# Patient Record
Sex: FEMALE | Race: WHITE | NOT HISPANIC OR LATINO | URBAN - METROPOLITAN AREA
[De-identification: names, ages, dates, MRNs, and addresses within clinical notes are randomized per-mention and may not be internally consistent; named-entity substitution may affect disease eponyms.]

---

## 2024-07-19 ENCOUNTER — OFFICE VISIT (OUTPATIENT)
Dept: OPHTHALMOLOGY | Facility: CLINIC | Age: 64
End: 2024-07-19
Payer: COMMERCIAL

## 2024-07-19 DIAGNOSIS — H25.13 NUCLEAR SCLEROSIS OF BOTH EYES: ICD-10-CM

## 2024-07-19 DIAGNOSIS — H18.832 RECURRENT EROSION OF LEFT CORNEA: ICD-10-CM

## 2024-07-19 DIAGNOSIS — H40.033 ANATOMICAL NARROW ANGLE OF BOTH EYES: Primary | ICD-10-CM

## 2024-07-19 PROCEDURE — 99999 PR PBB SHADOW E&M-NEW PATIENT-LVL II: CPT | Mod: PBBFAC,,, | Performed by: OPHTHALMOLOGY

## 2024-07-19 NOTE — PROGRESS NOTES
HPI     Eye Problem     Additional comments: Pt here for narrow angle eval. Pt says she went to   [facility] about a month ago and was told she has some high pressures   and has narrow angles. No fhx of glaucoma. No pain or discomfort. VA   stable.          Last edited by Nba Ríos MA on 7/19/2024  8:05 AM.            Assessment /Plan     For exam results, see Encounter Report.    Anatomical narrow angle of both eyes  IOP borderline. Angles narrow on gonioscopy but open with dynamic gonioscopy. Explained r/b/a to observation vs. LPI. Patient elects for LPI .  - Plan for LPI OD then OS. Patient to call when she is able to schedule.    Nuclear sclerosis of both eyes  Cataracts are present but not visually significant. Will continue to monitor.     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 Doing well using Systane, continue.

## 2024-07-30 ENCOUNTER — TELEPHONE (OUTPATIENT)
Dept: OPHTHALMOLOGY | Facility: CLINIC | Age: 64
End: 2024-07-30
Payer: COMMERCIAL

## 2024-07-30 NOTE — TELEPHONE ENCOUNTER
----- Message from Carlota Baez sent at 7/30/2024  2:46 PM CDT -----  Hello,    Patient called to schedule laser surgery appointment.  Please contact patient to schedule.    Thanks

## 2024-07-31 ENCOUNTER — TELEPHONE (OUTPATIENT)
Dept: OPHTHALMOLOGY | Facility: CLINIC | Age: 64
End: 2024-07-31
Payer: COMMERCIAL

## 2024-07-31 NOTE — TELEPHONE ENCOUNTER
"Spoke with patient who said Letty told her to "call back tomorrow with her LPI surgery date preference."  Patient stated sept 20th should work. I let her know I can not schedule her LPI and that Dr. Shepherd and her team were out for the day and could respond to her request tomorrow when they return. Pt expressed her frustration with us not being able to freely schedule procedures on behalf of another doctor's schedule and not knowing that they wouldn't be able to assist her the following day as she was told. I reassured patient she would have the ability to schedule within a timely manner and before the year end as she was recommended per Dr. Shepherd and as she would prefer.   Pt will call back 8/1/24 to schedule.   "

## 2024-08-26 ENCOUNTER — TELEPHONE (OUTPATIENT)
Dept: OPHTHALMOLOGY | Facility: CLINIC | Age: 64
End: 2024-08-26
Payer: COMMERCIAL

## 2024-10-21 ENCOUNTER — TELEPHONE (OUTPATIENT)
Dept: OPHTHALMOLOGY | Facility: CLINIC | Age: 64
End: 2024-10-21

## 2024-10-21 NOTE — TELEPHONE ENCOUNTER
----- Message from Divina sent at 10/21/2024  2:18 PM CDT -----  [phone] pt would like to speak with nurse staff about schedule Procedure she has questions for nurse

## 2024-11-08 ENCOUNTER — OFFICE VISIT (OUTPATIENT)
Dept: OPHTHALMOLOGY | Facility: CLINIC | Age: 64
End: 2024-11-08
Payer: COMMERCIAL

## 2024-11-08 DIAGNOSIS — H40.033 ANATOMICAL NARROW ANGLE OF BOTH EYES: Primary | ICD-10-CM

## 2024-11-08 PROCEDURE — 99999 PR PBB SHADOW E&M-EST. PATIENT-LVL II: CPT | Mod: PBBFAC,,, | Performed by: OPHTHALMOLOGY

## 2024-11-08 RX ORDER — PREDNISOLONE ACETATE 10 MG/ML
1 SUSPENSION/ DROPS OPHTHALMIC 4 TIMES DAILY
Qty: 5 ML | Refills: 0 | Status: SHIPPED | OUTPATIENT
Start: 2024-11-08 | End: 2024-11-08

## 2024-11-08 RX ORDER — PREDNISOLONE ACETATE 10 MG/ML
1 SUSPENSION/ DROPS OPHTHALMIC 4 TIMES DAILY
Qty: 5 ML | Refills: 0 | Status: SHIPPED | OUTPATIENT
Start: 2024-11-08 | End: 2025-11-08

## 2024-11-08 NOTE — PROGRESS NOTES
HPI     Post-op Evaluation     Additional comments: Pt reports for LPI OD  Pt states her VA has been stable and has no complaints of any new vision   changes.  Pt denies any eye pain.  Pt denies any new ocular disturbances.               Comments    Referred by [facility]    1. Narrow angles  No fhx COAG     Systane OU PRN    2. LPI OU          Last edited by Yessy Easley on 11/8/2024  8:53 AM.            Assessment /Plan     For exam results, see Encounter Report.    Anatomical narrow angle of both eyes  PROCEDURE NOTE    Pre-op Diagnosis : Anatomic Narrow Angle Glaucoma right Eye    Post-op Diagnosis : Anatomic Narrow Angle Glaucoma right Eye    Surgeon: Kenzie Shepherd M.D.    Complications : None    Time Out Sheet : completed and signed    The patient was then postitioned behind the Yag laser:    10 Bursts  1:1 ratio   2 mJoules    Patent PI was created at the conclusion of the procedure. The patient received Alphagan pre and post op and was discharged to home in good condition.   Discharge medications: Prednisolone Acetate 1% four times a day to the operative eye  RTC one week for LPI OS

## 2024-12-09 ENCOUNTER — OFFICE VISIT (OUTPATIENT)
Dept: OPHTHALMOLOGY | Facility: CLINIC | Age: 64
End: 2024-12-09
Payer: COMMERCIAL

## 2024-12-09 DIAGNOSIS — H40.033 ANATOMICAL NARROW ANGLE OF BOTH EYES: Primary | ICD-10-CM

## 2024-12-09 PROCEDURE — 99999 PR PBB SHADOW E&M-EST. PATIENT-LVL II: CPT | Mod: PBBFAC,,, | Performed by: OPHTHALMOLOGY

## 2024-12-09 PROCEDURE — 66761 REVISION OF IRIS: CPT | Mod: LT,S$GLB,, | Performed by: OPHTHALMOLOGY

## 2024-12-09 PROCEDURE — 99499 UNLISTED E&M SERVICE: CPT | Mod: S$GLB,,, | Performed by: OPHTHALMOLOGY

## 2024-12-09 NOTE — PROGRESS NOTES
HPI     LPI OS     Additional comments: Prednisolone Acetate 1% four times a day OD           Comments    1. Narrow angles  No fhx COAG      Systane OU PRN            Last edited by Tressa Joy on 12/9/2024 10:06 AM.            Assessment /Plan     For exam results, see Encounter Report.    Anatomical narrow angle of both eyes  PROCEDURE NOTE    Pre-op Diagnosis : Anatomic Narrow Angle Glaucoma left Eye    Post-op Diagnosis : Anatomic Narrow Angle Glaucoma left Eye    Surgeon: Kenzie Shepherd M.D.    Complications : None    Time Out Sheet : completed and signed    After obtaining informed consent, the patient was positioned behind the laser for an LPI.    The patient was then postitioned behind the Yag laser:    15 Bursts  1:1 ratio   2 mJoules    Patent PI was created at the conclusion of the procedure. The patient received Alphagan pre and post op and was discharged to home in good condition.   Discharge medications: Prednisolone Acetate 1% four times a day to the operative eye  RTC one week

## 2024-12-20 ENCOUNTER — OFFICE VISIT (OUTPATIENT)
Dept: OPHTHALMOLOGY | Facility: CLINIC | Age: 64
End: 2024-12-20
Payer: COMMERCIAL

## 2024-12-20 DIAGNOSIS — Z98.890 POST-OPERATIVE STATE: Primary | ICD-10-CM

## 2024-12-20 DIAGNOSIS — H40.033 ANATOMICAL NARROW ANGLE OF BOTH EYES: ICD-10-CM

## 2024-12-20 PROCEDURE — 99999 PR PBB SHADOW E&M-EST. PATIENT-LVL II: CPT | Mod: PBBFAC,,, | Performed by: OPHTHALMOLOGY

## 2024-12-20 NOTE — PROGRESS NOTES
HPI     Post-op Evaluation     Additional comments: Pt here for a LPI s/p done 12/09/24 OS; using   Prednisolone Acetate 1% four times a day. Also using Systane AT's about 3x   a week.   Pt states she is doing well w/ stable VA and no pains. No other concerns   at this time.            Comments    Referred by [facility]    1. Narrow angles  2. LPI OS 12/09/24    No fhx COAG      Systane OU PRN             Last edited by Melita Gonzales on 12/20/2024  8:25 AM.            Assessment /Plan     For exam results, see Encounter Report.    Post-operative state  Anatomical narrow angle of both eyes  IOP improved.     Return to clinic in 4 months for IOP check or sooner PRN

## 2025-01-14 ENCOUNTER — TELEPHONE (OUTPATIENT)
Dept: PODIATRY | Facility: CLINIC | Age: 65
End: 2025-01-14
Payer: COMMERCIAL

## 2025-01-14 NOTE — TELEPHONE ENCOUNTER
Sched pt for 1/28/25 at 7:15        ----- Message from Edwina sent at 1/14/2025  8:20 AM CST -----  Contact: Jasmine Weaver Jasmine needs a call back at .[phone] in regards to scheduling an appointment.    Thanks

## 2025-01-28 ENCOUNTER — OFFICE VISIT (OUTPATIENT)
Dept: PODIATRY | Facility: CLINIC | Age: 65
End: 2025-01-28
Payer: COMMERCIAL

## 2025-01-28 ENCOUNTER — HOSPITAL ENCOUNTER (OUTPATIENT)
Dept: RADIOLOGY | Facility: HOSPITAL | Age: 65
Discharge: HOME | End: 2025-01-28
Attending: PODIATRIST
Payer: COMMERCIAL

## 2025-01-28 VITALS — HEIGHT: 62 IN | WEIGHT: 190 LBS | BODY MASS INDEX: 34.96 KG/M2

## 2025-01-28 DIAGNOSIS — M77.41 METATARSALGIA OF RIGHT FOOT: ICD-10-CM

## 2025-01-28 DIAGNOSIS — M77.41 METATARSALGIA OF RIGHT FOOT: Primary | ICD-10-CM

## 2025-01-28 DIAGNOSIS — M79.671 RIGHT FOOT PAIN: ICD-10-CM

## 2025-01-28 PROCEDURE — 1159F MED LIST DOCD IN RCRD: CPT | Mod: CPTII,S$GLB,, | Performed by: PODIATRIST

## 2025-01-28 PROCEDURE — 73630 X-RAY EXAM OF FOOT: CPT | Mod: TC,RT

## 2025-01-28 PROCEDURE — 1160F RVW MEDS BY RX/DR IN RCRD: CPT | Mod: CPTII,S$GLB,, | Performed by: PODIATRIST

## 2025-01-28 PROCEDURE — 99999 PR PBB SHADOW E&M-EST. PATIENT-LVL III: CPT | Mod: PBBFAC,,, | Performed by: PODIATRIST

## 2025-01-28 PROCEDURE — 3008F BODY MASS INDEX DOCD: CPT | Mod: CPTII,S$GLB,, | Performed by: PODIATRIST

## 2025-01-28 PROCEDURE — 73630 X-RAY EXAM OF FOOT: CPT | Mod: 26,RT,, | Performed by: RADIOLOGY

## 2025-01-28 PROCEDURE — 99204 OFFICE O/P NEW MOD 45 MIN: CPT | Mod: S$GLB,,, | Performed by: PODIATRIST

## 2025-01-28 RX ORDER — METHYLPREDNISOLONE 4 MG/1
4 TABLET ORAL DAILY
Qty: 1 EACH | Refills: 0 | Status: SHIPPED | OUTPATIENT
Start: 2025-01-28

## 2025-01-28 RX ORDER — DEXTROMETHORPHAN HYDROBROMIDE, GUAIFENESIN 5; 100 MG/5ML; MG/5ML
650 LIQUID ORAL EVERY 8 HOURS
COMMUNITY

## 2025-01-28 NOTE — PROGRESS NOTES
"Subjective:     Patient ID: Jasmine Packer is a 64 y.o. female.    Chief Complaint: Foot Pain (Non-diabetic pt c/o ball of right foot pain, pt rates 1/10 pain, when ambulating 5/10 pain, pt wears flats, no PCP )    Jasmine is a 64 y.o. female who presents to the podiatry clinic  with complaint of  right foot pain. Onset of the symptoms was several months ago. Precipitating event: none known. Current symptoms include: ability to bear weight, but with some pain. Aggravating factors: walking. Symptoms have gradually worsened. Patient has had no prior foot problems. Evaluation to date: plain films: arthritis . Treatment to date: OTC analgesics which are somewhat effective. Patients rates pain 1/10 on pain scale. Patient points to ball of right foot. Patient has no other pedal complaints at this time.     There is no problem list on file for this patient.      Medication List with Changes/Refills   New Medications    METHYLPREDNISOLONE (MEDROL DOSEPACK) 4 MG TABLET    Take 1 tablet (4 mg total) by mouth once daily. Use as instructed on dose pack   Current Medications    ACETAMINOPHEN (TYLENOL) 650 MG TBSR    Take 650 mg by mouth every 8 (eight) hours.    ASCORBIC ACID, VITAMIN C, (VITAMIN C) 100 MG TABLET    Take 100 mg by mouth once daily.    PREDNISOLONE ACETATE (PRED FORTE) 1 % DRPS    Place 1 drop into the left eye 4 (four) times daily.    ZINC SULFATE 66 MG TAB    Take 1 tablet by mouth once daily.       Review of patient's allergies indicates:  No Known Allergies    History reviewed. No pertinent surgical history.    No family history on file.    Social History     Socioeconomic History    Marital status: Unknown   Tobacco Use    Smoking status: Never    Smokeless tobacco: Never       Vitals:    01/28/25 0719   Weight: 86.2 kg (190 lb)   Height: 5' 2" (1.575 m)   PainSc:   1       Hemoglobin A1C   Date Value Ref Range Status   01/28/2021 5.2 4.2 - 5.8 % Final       Review of Systems   Constitutional:  Negative for " chills and fever.   Respiratory:  Negative for shortness of breath.    Cardiovascular:  Negative for chest pain, palpitations, orthopnea, claudication and leg swelling.   Gastrointestinal:  Negative for diarrhea, nausea and vomiting.   Musculoskeletal:  Positive for joint pain (right 1st and 2nd MPJ).   Skin:  Negative for rash.   Neurological:  Negative for dizziness, tingling, sensory change, focal weakness and weakness.   Psychiatric/Behavioral: Negative.           Objective:       PHYSICAL EXAM: Apperance: Alert and orient in no distress,well developed, and with good attention to grooming and body habits  Patient presents ambulating in casual shoes.   Lower Extremity Physical Exam:  VASCULAR: Dorsalis pedis pulses 2/4 bilateral and Posterior Tibial pulses 2/4 bilateral.   MUSCULOSKELETAL: Muscle strength is 5/5 for foot inverters, everters, plantarflexors, and dorsiflexors. Muscle tone is normal. (+) pain on palpation of right 1st and 2nd intermetatarsal spaces. Tenderness on right 1st and 2nd MPJ ROM.  (--) Tanika's click.         Assessment:       ICD-10-CM ICD-9-CM   1. Metatarsalgia of right foot - Right Foot  M77.41 726.70       Plan:   Metatarsalgia of right foot - Right Foot  -     X-Ray Foot Complete Right; Future; Expected date: 01/28/2025  -     methylPREDNISolone (MEDROL DOSEPACK) 4 mg tablet; Take 1 tablet (4 mg total) by mouth once daily. Use as instructed on dose pack  Dispense: 1 each; Refill: 0      I counseled the patient on her conditions, regarding findings of my examination, my impressions, and usual treatment plan.   I explained to the patient that etiology and treatment options for metatarsalgia pain including rest, ice messages,  new shoegear and inserts, NSAID's,  Injections, physical therapy, and/or surgical intervention  Patient agreed to oral and post op shoe.   Ordered right foot x-rays to be completed today.   Patient was fitted with surgical shoe and instructed on proper usage. This  should be worn daily for a minimum of 2 weeks at all times when ambulating. Patient instructed not to drive with walking boot if on right foot.   The patient and I reviewed the types of shoes she should be wearing, my recommendation includes generally the best time of the day for a shoe fitting is the afternoon, shoes with a wide toe box, very good cushion, and tennis shoes with removable inner soles.The patient and I reviewed my recommendations for over-the-counter orthotic inserts.   Patient to return in 4 weeks or sooner if needed.              Daja Hopkins DPM  [facility]

## 2025-02-19 ENCOUNTER — TELEPHONE (OUTPATIENT)
Dept: PODIATRY | Facility: CLINIC | Age: 65
End: 2025-02-19
Payer: COMMERCIAL

## 2025-02-19 NOTE — TELEPHONE ENCOUNTER
Called the pt no answer, no VM to leave a message            ----- Message from Rosemarie sent at 2/19/2025  7:41 AM CST -----  Contact: VADIM TEJEDA [[id number]]  ..Type:  Patient Requesting CallWho Called:VADIM TEJEDA [[id number]]Does the patient know what this is regarding?:pt made a mistake and hit cancelled for appt 02/25/2025 and is asking provider is it possibly to have that appt back at 2:45 pm Would the patient rather a call back or a response via [facility]? [facility] Call Back Number:.[phone] (home) Additional Information:

## 2025-02-28 ENCOUNTER — OFFICE VISIT (OUTPATIENT)
Dept: PODIATRY | Facility: CLINIC | Age: 65
End: 2025-02-28
Payer: COMMERCIAL

## 2025-02-28 VITALS — BODY MASS INDEX: 34.98 KG/M2 | HEIGHT: 62 IN | WEIGHT: 190.06 LBS

## 2025-02-28 DIAGNOSIS — M19.071 DJD (DEGENERATIVE JOINT DISEASE), ANKLE AND FOOT, RIGHT: Primary | ICD-10-CM

## 2025-02-28 PROCEDURE — 99999 PR PBB SHADOW E&M-EST. PATIENT-LVL III: CPT | Mod: PBBFAC,,, | Performed by: PODIATRIST

## 2025-02-28 RX ORDER — ACETAMINOPHEN AND IBUPROFEN 250; 125 MG/1; MG/1
TABLET, FILM COATED ORAL
COMMUNITY

## 2025-02-28 NOTE — PROGRESS NOTES
"Subjective:     Patient ID: Jasmine Packer is a 64 y.o. female.    Chief Complaint: Foot Pain (Non-diabetic pt rates 2/10 pain, pt wears tennis shoes, PCP Caridad Hannah MD last seen 1/9/2025)    Jasmine is a 64 y.o. female who presents to the podiatry clinic for follow up of right foot pain. Patient states pain is better but still present. Current symptoms include: ability to bear weight, but with some pain. Aggravating factors: walking. Symptoms have gradually improved. Treatment to date:  changed shoes, post op shoe and oral steroid . Patients rates pain 2/10 on pain scale. Patient has no other pedal complaints at this time.     Problem List[1]    Medication List with Changes/Refills   Current Medications    ACETAMINOPHEN (TYLENOL) 650 MG TBSR    Take 650 mg by mouth every 8 (eight) hours.    ASCORBIC ACID, VITAMIN C, (VITAMIN C) 100 MG TABLET    Take 100 mg by mouth once daily.    IBUPROFEN-ACETAMINOPHEN 125-250 MG TAB    Take by mouth every 8 (eight) hours.    METHYLPREDNISOLONE (MEDROL DOSEPACK) 4 MG TABLET    Take 1 tablet (4 mg total) by mouth once daily. Use as instructed on dose pack    PREDNISOLONE ACETATE (PRED FORTE) 1 % DRPS    Place 1 drop into the left eye 4 (four) times daily.    ZINC SULFATE 66 MG TAB    Take 1 tablet by mouth once daily.       Review of patient's allergies indicates:  No Known Allergies    History reviewed. No pertinent surgical history.    No family history on file.    Social History[2]    Vitals:    02/28/25 0815   Weight: 86.2 kg (190 lb 0.6 oz)   Height: 5' 2" (1.575 m)   PainSc:   2       Review of Systems   Constitutional:  Negative for chills and fever.   Respiratory:  Negative for shortness of breath.    Cardiovascular:  Negative for chest pain, palpitations, orthopnea, claudication and leg swelling.   Gastrointestinal:  Negative for diarrhea, nausea and vomiting.   Musculoskeletal:  Negative for joint pain.   Skin:  Negative for rash.   Neurological:  Negative for " dizziness, tingling, sensory change, focal weakness and weakness.   Psychiatric/Behavioral: Negative.             Objective:      PHYSICAL EXAM: Apperance: Alert and orient in no distress,well developed, and with good attention to grooming and body habits  Patient presents ambulating in casual shoes.   Lower Extremity Physical Exam:  VASCULAR: Dorsalis pedis pulses 2/4 bilateral and Posterior Tibial pulses 2/4 bilateral.   MUSCULOSKELETAL: Muscle strength is 5/5 for foot inverters, everters, plantarflexors, and dorsiflexors. Muscle tone is normal. (--) decreased pain on palpation of right 1st and 2nd intermetatarsal spaces. Tenderness on right 1st and 2nd MPJ ROM.  (--) Tanika's click.     TEST RESULTS: Radiographs of right foot/ankle taken reveals severe osteoarthritis of the first TMT joint with joint space narrowing large osteophytes. Mild osteoarthritis of the first MTP joint. Remaining joint spaces well-preserved.           Assessment:       ICD-10-CM ICD-9-CM   1. DJD (degenerative joint disease), ankle and foot, right - Right Foot  M19.071 715.97       Plan:   DJD (degenerative joint disease), ankle and foot, right - Right Foot      I counseled the patient on her conditions, regarding findings of my examination, my impressions, and usual treatment plan.   Reviewed right foot x-rays in exam room with patient.   The patient and I reviewed the types of shoes she should be wearing, my recommendation includes generally the best time of the day for a shoe fitting is the afternoon, shoes with a wide toe box, very good cushion, and tennis shoes with removable inner soles.The patient and I reviewed my recommendations for over-the-counter orthotic inserts.   Patient to return if symptoms persist or worsen.            Daja Hopkins DPM  [facility]            [1] There is no problem list on file for this patient.   [2]   Social History  Socioeconomic History    Marital status: Unknown   Tobacco Use    Smoking  status: Never    Smokeless tobacco: Never

## 2025-04-21 ENCOUNTER — OFFICE VISIT (OUTPATIENT)
Dept: OPHTHALMOLOGY | Facility: CLINIC | Age: 65
End: 2025-04-21
Payer: COMMERCIAL

## 2025-04-21 DIAGNOSIS — H25.13 NUCLEAR SCLEROSIS OF BOTH EYES: ICD-10-CM

## 2025-04-21 DIAGNOSIS — H40.033 ANATOMICAL NARROW ANGLE OF BOTH EYES: Primary | ICD-10-CM

## 2025-04-21 PROCEDURE — 1160F RVW MEDS BY RX/DR IN RCRD: CPT | Mod: CPTII,S$GLB,, | Performed by: OPHTHALMOLOGY

## 2025-04-21 PROCEDURE — 1159F MED LIST DOCD IN RCRD: CPT | Mod: CPTII,S$GLB,, | Performed by: OPHTHALMOLOGY

## 2025-04-21 PROCEDURE — 99213 OFFICE O/P EST LOW 20 MIN: CPT | Mod: S$GLB,,, | Performed by: OPHTHALMOLOGY

## 2025-04-21 PROCEDURE — 99999 PR PBB SHADOW E&M-EST. PATIENT-LVL II: CPT | Mod: PBBFAC,,, | Performed by: OPHTHALMOLOGY

## 2025-04-21 NOTE — PROGRESS NOTES
HPI     Glaucoma     Additional comments: Here for IOP check.  History of NAG OU, s/p LPIs OU.    No complaints.           Comments    4 month IOP check.    1. Narrow angles  2. LPI OD 11/8/24  LPI OS 12/09/24    No fhx COAG      Systane OU PRN             Last edited by Shaila Vuong MA on 4/21/2025  2:58 PM.            Assessment /Plan     For exam results, see Encounter Report.    Anatomical narrow angle of both eyes  Doing well, intraocular pressure (IOP) within acceptable range relative to target IOP with no evidence of progression. Continue current treatment. Reviewed importance of continued compliance with treatment and follow up.        RTC in 6 months with HVF 24-2, gOCT with GC , and IOP check.    Nuclear sclerosis of both eyes  Cataracts are present but not visually significant. Will continue to monitor.